# Patient Record
Sex: MALE | Race: WHITE
[De-identification: names, ages, dates, MRNs, and addresses within clinical notes are randomized per-mention and may not be internally consistent; named-entity substitution may affect disease eponyms.]

---

## 2020-12-07 ENCOUNTER — HOSPITAL ENCOUNTER (EMERGENCY)
Dept: HOSPITAL 75 - ER FS | Age: 65
Discharge: HOME | End: 2020-12-07
Payer: MEDICARE

## 2020-12-07 VITALS — SYSTOLIC BLOOD PRESSURE: 143 MMHG | DIASTOLIC BLOOD PRESSURE: 71 MMHG

## 2020-12-07 VITALS — HEIGHT: 73.03 IN | BODY MASS INDEX: 25.19 KG/M2 | WEIGHT: 190.04 LBS

## 2020-12-07 DIAGNOSIS — R06.02: Primary | ICD-10-CM

## 2020-12-07 LAB
ALBUMIN SERPL-MCNC: 3.5 GM/DL (ref 3.2–4.5)
ALP SERPL-CCNC: 239 U/L (ref 40–136)
ALT SERPL-CCNC: 50 U/L (ref 0–55)
BASOPHILS # BLD AUTO: 0 10^3/UL (ref 0–0.1)
BASOPHILS NFR BLD AUTO: 1 % (ref 0–10)
BASOPHILS NFR BLD MANUAL: 0 %
BILIRUB SERPL-MCNC: 1.2 MG/DL (ref 0.1–1)
BUN/CREAT SERPL: 16
CALCIUM SERPL-MCNC: 9.9 MG/DL (ref 8.5–10.1)
CHLORIDE SERPL-SCNC: 99 MMOL/L (ref 98–107)
CO2 SERPL-SCNC: 25 MMOL/L (ref 21–32)
CREAT SERPL-MCNC: 0.97 MG/DL (ref 0.6–1.3)
EOSINOPHIL # BLD AUTO: 0.1 10^3/UL (ref 0–0.3)
EOSINOPHIL NFR BLD AUTO: 3 % (ref 0–10)
EOSINOPHIL NFR BLD MANUAL: 1 %
GFR SERPLBLD BASED ON 1.73 SQ M-ARVRAT: > 60 ML/MIN
GLUCOSE SERPL-MCNC: 125 MG/DL (ref 70–105)
HCT VFR BLD CALC: 48 % (ref 40–54)
HGB BLD-MCNC: 16.5 G/DL (ref 13.3–17.7)
LYMPHOCYTES # BLD AUTO: 0.9 X 10^3 (ref 1–4)
LYMPHOCYTES NFR BLD AUTO: 39 % (ref 12–44)
MANUAL DIFFERENTIAL PERFORMED BLD QL: YES
MCH RBC QN AUTO: 31 PG (ref 25–34)
MCHC RBC AUTO-ENTMCNC: 35 G/DL (ref 32–36)
MCV RBC AUTO: 90 FL (ref 80–99)
MONOCYTES # BLD AUTO: 0 X 10^3 (ref 0–1)
MONOCYTES NFR BLD AUTO: 0 % (ref 0–12)
MONOCYTES NFR BLD: 1 %
NEUTROPHILS # BLD AUTO: 1.3 X 10^3 (ref 1.8–7.8)
NEUTROPHILS NFR BLD AUTO: 55 % (ref 42–75)
NEUTS BAND NFR BLD MANUAL: 52 %
NEUTS BAND NFR BLD: 1 %
NEUTS HYPERSEG BLD QL SMEAR: (no result)
PLATELET # BLD EST: (no result) 10*3/UL
PLATELET # BLD: 78 10^3/UL (ref 130–400)
PMV BLD AUTO: 11.5 FL (ref 7.4–10.4)
POTASSIUM SERPL-SCNC: 3.8 MMOL/L (ref 3.6–5)
PROT SERPL-MCNC: 7.9 GM/DL (ref 6.4–8.2)
RBC MORPH BLD: NORMAL
SODIUM SERPL-SCNC: 138 MMOL/L (ref 135–145)
VARIANT LYMPHS NFR BLD MANUAL: 40 %
VARIANT LYMPHS NFR BLD MANUAL: 5 %
WBC # BLD AUTO: 2.4 10^3/UL (ref 4.3–11)

## 2020-12-07 PROCEDURE — 84484 ASSAY OF TROPONIN QUANT: CPT

## 2020-12-07 PROCEDURE — 85007 BL SMEAR W/DIFF WBC COUNT: CPT

## 2020-12-07 PROCEDURE — 36415 COLL VENOUS BLD VENIPUNCTURE: CPT

## 2020-12-07 PROCEDURE — 71045 X-RAY EXAM CHEST 1 VIEW: CPT

## 2020-12-07 PROCEDURE — 85027 COMPLETE CBC AUTOMATED: CPT

## 2020-12-07 PROCEDURE — 80053 COMPREHEN METABOLIC PANEL: CPT

## 2020-12-07 NOTE — DIAGNOSTIC IMAGING REPORT
INDICATION: Shortness of breath.



FINDINGS: There are background features of underlying chronic

interstitial lung disease with flattened diaphragms suggesting

air trapping and underlying COPD. There also are superimposed

alveolar opacities present at the right lung base suspect for

superimposed pneumonia.



There is no significant effusion. There is no pneumothorax. Heart

size is appropriate and pulmonary vascularity appears

appropriate.



IMPRESSION: Apparent advanced background features of interstitial

lung disease and COPD with superimposed alveolar opacities at the

right base suggesting pneumonia.



Dictated by: 



  Dictated on workstation # [id number]